# Patient Record
Sex: MALE | Race: WHITE | NOT HISPANIC OR LATINO | Employment: FULL TIME | URBAN - METROPOLITAN AREA
[De-identification: names, ages, dates, MRNs, and addresses within clinical notes are randomized per-mention and may not be internally consistent; named-entity substitution may affect disease eponyms.]

---

## 2023-06-26 ENCOUNTER — OFFICE VISIT (OUTPATIENT)
Dept: INTERNAL MEDICINE | Facility: CLINIC | Age: 48
End: 2023-06-26
Payer: COMMERCIAL

## 2023-06-26 VITALS
TEMPERATURE: 97 F | BODY MASS INDEX: 29.56 KG/M2 | WEIGHT: 218.25 LBS | DIASTOLIC BLOOD PRESSURE: 90 MMHG | SYSTOLIC BLOOD PRESSURE: 146 MMHG | HEART RATE: 70 BPM | OXYGEN SATURATION: 99 % | HEIGHT: 72 IN

## 2023-06-26 DIAGNOSIS — H93.11 TINNITUS OF RIGHT EAR: Primary | ICD-10-CM

## 2023-06-26 PROCEDURE — 99999 PR PBB SHADOW E&M-NEW PATIENT-LVL IV: CPT | Mod: PBBFAC,,, | Performed by: NURSE PRACTITIONER

## 2023-06-26 PROCEDURE — 99202 PR OFFICE/OUTPT VISIT, NEW, LEVL II, 15-29 MIN: ICD-10-PCS | Mod: S$GLB,,, | Performed by: NURSE PRACTITIONER

## 2023-06-26 PROCEDURE — 99202 OFFICE O/P NEW SF 15 MIN: CPT | Mod: S$GLB,,, | Performed by: NURSE PRACTITIONER

## 2023-06-26 PROCEDURE — 99999 PR PBB SHADOW E&M-NEW PATIENT-LVL IV: ICD-10-PCS | Mod: PBBFAC,,, | Performed by: NURSE PRACTITIONER
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
## 2023-07-03 ENCOUNTER — OFFICE VISIT (OUTPATIENT)
Dept: OTOLARYNGOLOGY | Facility: CLINIC | Age: 48
End: 2023-07-03
Payer: COMMERCIAL

## 2023-07-03 ENCOUNTER — CLINICAL SUPPORT (OUTPATIENT)
Dept: AUDIOLOGY | Facility: CLINIC | Age: 48
End: 2023-07-03
Payer: COMMERCIAL

## 2023-07-03 VITALS
BODY MASS INDEX: 29.53 KG/M2 | DIASTOLIC BLOOD PRESSURE: 79 MMHG | HEIGHT: 72 IN | WEIGHT: 218 LBS | SYSTOLIC BLOOD PRESSURE: 131 MMHG

## 2023-07-03 DIAGNOSIS — H93.11 TINNITUS OF RIGHT EAR: Primary | ICD-10-CM

## 2023-07-03 DIAGNOSIS — H91.20 SUDDEN-ONSET SENSORINEURAL HEARING LOSS: Primary | ICD-10-CM

## 2023-07-03 DIAGNOSIS — H93.11 TINNITUS, RIGHT: ICD-10-CM

## 2023-07-03 DIAGNOSIS — H90.3 SENSORINEURAL HEARING LOSS, BILATERAL: ICD-10-CM

## 2023-07-03 PROCEDURE — 99204 OFFICE O/P NEW MOD 45 MIN: CPT | Mod: S$GLB,,, | Performed by: OTOLARYNGOLOGY

## 2023-07-03 PROCEDURE — 99999 PR PBB SHADOW E&M-EST. PATIENT-LVL III: CPT | Mod: PBBFAC,,, | Performed by: OTOLARYNGOLOGY

## 2023-07-03 PROCEDURE — 99999 PR PBB SHADOW E&M-EST. PATIENT-LVL III: ICD-10-PCS | Mod: PBBFAC,,, | Performed by: OTOLARYNGOLOGY

## 2023-07-03 PROCEDURE — 92557 COMPREHENSIVE HEARING TEST: CPT | Mod: S$GLB,,,

## 2023-07-03 PROCEDURE — 92557 PR COMPREHENSIVE HEARING TEST: ICD-10-PCS | Mod: S$GLB,,,

## 2023-07-03 PROCEDURE — 92567 TYMPANOMETRY: CPT | Mod: S$GLB,,,

## 2023-07-03 PROCEDURE — 99999 PR PBB SHADOW E&M-EST. PATIENT-LVL II: CPT | Mod: PBBFAC,,,

## 2023-07-03 PROCEDURE — 99204 PR OFFICE/OUTPT VISIT, NEW, LEVL IV, 45-59 MIN: ICD-10-PCS | Mod: S$GLB,,, | Performed by: OTOLARYNGOLOGY

## 2023-07-03 PROCEDURE — 99999 PR PBB SHADOW E&M-EST. PATIENT-LVL II: ICD-10-PCS | Mod: PBBFAC,,,

## 2023-07-03 PROCEDURE — 92567 PR TYMPA2METRY: ICD-10-PCS | Mod: S$GLB,,,

## 2023-07-03 RX ORDER — PREDNISONE 20 MG/1
TABLET ORAL
Qty: 21 TABLET | Refills: 0 | Status: SHIPPED | OUTPATIENT
Start: 2023-07-03
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
## 2023-07-03 NOTE — PROGRESS NOTES
Referring Provider:    Melissa Felton, Willie  [address] [facility]  [place]  [place] [address]  Subjective:   Patient: Chidi Carvajal [id number], :1975   Visit date:7/3/2023 1:20 PM    Chief Complaint:  Otalgia (Pt states started 2 weeks ago on the right side with tinnitus)    HPI:    Prior notes reviewed by myself.  Clinical documentation obtained by nursing staff reviewed.     48-year-old gentleman presents for evaluation of hearing loss and new onset tinnitus.  He noticed a sudden change in his hearing roughly 2 weeks ago.  This was not associated with any trauma, infection or any other obvious event.  Since that time, his tinnitus has been stable and nonpulsatile on the right side.  No prior otologic history.  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        Objective:     Physical Exam:  Vitals:  /79   Ht 6' (1.829 m)   Wt 98.9 kg (218 lb)   BMI 29.57 kg/m²   General appearance:  Well developed, well nourished    Ears:  Otoscopy of external auditory canals and tympanic membranes was normal, clinical speech reception thresholds grossly intact, no mass/lesion of auricle.    Nose:  No masses/lesions of external nose, nasal mucosa, septum, and turbinates were within normal limits.    Mouth:  No mass/lesion of lips, teeth, gums, hard/soft palate, tongue, tonsils, or oropharynx.    Neck & Lymphatics:  No cervical lymphadenopathy, no neck mass/crepitus/ asymmetry, trachea is midline, no thyroid enlargement/tenderness/mass.        [x]  Data Reviewed:    No results found for: WBC, HGB, HCT, MCV, LABPLAT, EOSINOPHIL      [x]  Independent interpretation of test: mod to severe low freq HL, noise notch bilaterally at 4k     Media Information    File Link    Annotation on 7/3/2023  1:05 PM by Derrek Weeks CCC-A: AUDIOGRAM        Key Information    Document ID File Type Document Type Description   [id number] Annotation Annotation AUDIOGRAM      Import Information    Attached At Date Time User Dept   Encounter Level 7/3/2023  1:05 PM Derrek Weeks, CCC-A [facility] Audiology     Encounter    Clinical Support on 7/3/23 with Derrek Weeks CCC-A             Assessment & Plan:   Sudden-onset sensorineural hearing loss  -     predniSONE (DELTASONE) 20 MG tablet; Take 3 tab in am x 3d, then 2 tab in am x 3d, then 1 tab in am x 3d, then 1/2 tab in am x 3d  Dispense: 21 tablet; Refill: 0    Tinnitus, right        He has a large asymmetry in his low frequencies with unilateral right-sided tinnitus and a moderate to severe sensorineural hearing loss on the same side.  We reviewed options including continued observation versus steroid treatment via oral prednisone and/or transtympanic steroid injection.  He would like to move forward with oral steroid treatment and will return in 2 weeks.  If his hearing is not symmetric at that point, we will discuss the option for an MRI to rule out any retrocochlear pathology as well as amplification options.

## 2023-07-17 ENCOUNTER — OFFICE VISIT (OUTPATIENT)
Dept: OTOLARYNGOLOGY | Facility: CLINIC | Age: 48
End: 2023-07-17
Payer: COMMERCIAL

## 2023-07-17 ENCOUNTER — CLINICAL SUPPORT (OUTPATIENT)
Dept: AUDIOLOGY | Facility: CLINIC | Age: 48
End: 2023-07-17
Payer: COMMERCIAL

## 2023-07-17 DIAGNOSIS — H90.3 SENSORINEURAL HEARING LOSS, BILATERAL: Primary | ICD-10-CM

## 2023-07-17 DIAGNOSIS — H93.11 TINNITUS OF RIGHT EAR: ICD-10-CM

## 2023-07-17 DIAGNOSIS — H90.A21 SENSORINEURAL HEARING LOSS (SNHL) OF RIGHT EAR WITH RESTRICTED HEARING OF LEFT EAR: Primary | ICD-10-CM

## 2023-07-17 DIAGNOSIS — H93.11 TINNITUS, RIGHT: ICD-10-CM

## 2023-07-17 PROCEDURE — 92557 COMPREHENSIVE HEARING TEST: CPT | Mod: S$GLB,,,

## 2023-07-17 PROCEDURE — 99999 PR PBB SHADOW E&M-EST. PATIENT-LVL I: CPT | Mod: PBBFAC,,,

## 2023-07-17 PROCEDURE — 99999 PR PBB SHADOW E&M-EST. PATIENT-LVL II: ICD-10-PCS | Mod: PBBFAC,,, | Performed by: OTOLARYNGOLOGY

## 2023-07-17 PROCEDURE — 99999 PR PBB SHADOW E&M-EST. PATIENT-LVL II: CPT | Mod: PBBFAC,,, | Performed by: OTOLARYNGOLOGY

## 2023-07-17 PROCEDURE — 92557 PR COMPREHENSIVE HEARING TEST: ICD-10-PCS | Mod: S$GLB,,,

## 2023-07-17 PROCEDURE — 99999 PR PBB SHADOW E&M-EST. PATIENT-LVL I: ICD-10-PCS | Mod: PBBFAC,,,

## 2023-07-17 PROCEDURE — 99213 OFFICE O/P EST LOW 20 MIN: CPT | Mod: S$GLB,,, | Performed by: OTOLARYNGOLOGY

## 2023-07-17 PROCEDURE — 99213 PR OFFICE/OUTPT VISIT, EST, LEVL III, 20-29 MIN: ICD-10-PCS | Mod: S$GLB,,, | Performed by: OTOLARYNGOLOGY

## 2023-07-17 NOTE — PROGRESS NOTES
Chidi Carvajal was seen 07/17/2023 for an audiological evaluation. Previous audiological evaluation on 07/03/2023 revealed a moderately-severe sensorineural hearing loss rising to normal hearing sensitivity from 5010-9867 Hz sloping to a moderate sensorineural hearing loss at 4000 Hz rising to normal hearing sensitivity at 8000 Hz for the right ear, and normal hearing sensitivity through 3000 Hz sloping to a mild sensorineural hearing loss at 3083-9619 Hz rising to normal hearing sensitivity at 8000 Hz for the left ear. Patient reported slight improvement in tinnitus since previous evaluation.     Otoscopy revealed clear canals with visualization of the tympanic membrane in both ears. Audiometry revealed a moderate sensorineural hearing loss rising to normal hearing sensitivity 5042-4958 Hz sloping to a moderate sensorineural hearing loss at 4000 Hz rising to normal hearing sensitivity at 2258-4062 Hz for the right ear, and normal hearing sensitivity through 3000 Hz sloping to a mild sensorineural hearing loss at 4000 Hz rising to normal hearing sensitivity for the left ear. Speech Reception Thresholds were  25 dBHL for the right ear and 15 dBHL for the left ear. Word recognition scores were excellent for the right ear and excellent for the left ear.    Patient was counseled on the above findings.    Recommendations:  Follow-up with ENT, as scheduled.  Repeat audiological evaluation per ENT, or sooner if needed.  Consider hearing aid consult for the right ear.

## 2023-07-17 NOTE — PROGRESS NOTES
Referring Provider:    No referring provider defined for this encounter.  Subjective:   Patient: Chidi Carvajal [id number], :1975   Visit date:2023 1:20 PM    Chief Complaint:  Tinnitus (Ringing has diminished in the last two weeks but still present )    HPI:    Prior notes reviewed by myself.  Clinical documentation obtained by nursing staff reviewed.     48-year-old gentleman presents for evaluation of hearing loss and new onset tinnitus.  He noticed a sudden change in his hearing roughly 2 weeks ago.  This was not associated with any trauma, infection or any other obvious event.  Since that time, his tinnitus has been stable and nonpulsatile on the right side.  No prior otologic history.  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      23 update:  He feels slightly improved      Objective:     Physical Exam:  Vitals:  There were no vitals taken for this visit.  General appearance:  Well developed, well nourished    Ears:  Otoscopy of external auditory canals and tympanic membranes was normal, clinical speech reception thresholds grossly intact, no mass/lesion of auricle.    Nose:  No masses/lesions of external nose, nasal mucosa, septum, and turbinates were within normal limits.    Mouth:  No mass/lesion of lips, teeth, gums, hard/soft palate, tongue, tonsils, or oropharynx.    Neck & Lymphatics:  No cervical lymphadenopathy, no neck mass/crepitus/ asymmetry, trachea is midline, no thyroid enlargement/tenderness/mass.        [x]  Data Reviewed:    No results found for: WBC, HGB, HCT, MCV, LABPLAT, EOSINOPHIL      [x]  Independent interpretation of test: mod to severe low freq HL, noise notch bilaterally at 4k.  Improvement (partial) on repeat audio, persistent low frequency asymmetry.     Media Information    File Link    Annotation on 7/3/2023  1:05 PM by Derrek Weeks, VIVIAN-A: AUDIOGRAM        Key Information    Document ID File Type  Document Type Description   [id number] Annotation Annotation AUDIOGRAM     Import Information    Attached At Date Time User Dept   Encounter Level 7/3/2023  1:05 PM Derrek Weeks CCC-A [facility] Audiology     Encounter    Clinical Support on 7/3/23 with Derrek Weeks CCC-A        Media Information    File Link    Annotation on 7/17/2023  1:27 PM by Derrek Weeks CCC-A: AUDIOGRAM        Key Information    Document ID File Type Document Type Description   [id number] Annotation Annotation AUDIOGRAM     Import Information    Attached At Date Time User Dept   Encounter Level 7/17/2023  1:27 PM Derrek Weeks CCC-A [facility] Audiology     Encounter    Clinical Support on 7/17/23 with Derrek Weeks CCC-A           Assessment & Plan:   Sensorineural hearing loss (SNHL) of right ear with restricted hearing of left ear  -     MRI IAC/Temporal Bones W W/O Contrast; Future; Expected date: 07/17/2023    Tinnitus, right          He has a large asymmetry in his low frequencies with unilateral right-sided tinnitus and a moderate to severe sensorineural hearing loss on the same side.  We reviewed options including continued observation versus steroid treatment via oral prednisone and/or transtympanic steroid injection.  He would like to move forward with oral steroid treatment and will return in 2 weeks.  If his hearing is not symmetric at that point, we will discuss the option for an MRI to rule out any retrocochlear pathology as well as amplification options.    7/17/23 update:  He has a persistent low-frequency asymmetry but he did have some improvement.  We discussed options and agreed to move forward with imaging to rule out retrocochlear pathology.  I also recommended a ECochG to test for hydrops.